# Patient Record
Sex: MALE | Race: WHITE | HISPANIC OR LATINO
[De-identification: names, ages, dates, MRNs, and addresses within clinical notes are randomized per-mention and may not be internally consistent; named-entity substitution may affect disease eponyms.]

---

## 2021-05-25 ENCOUNTER — APPOINTMENT (OUTPATIENT)
Dept: FAMILY MEDICINE | Facility: CLINIC | Age: 61
End: 2021-05-25
Payer: COMMERCIAL

## 2021-05-25 VITALS — SYSTOLIC BLOOD PRESSURE: 117 MMHG | DIASTOLIC BLOOD PRESSURE: 80 MMHG

## 2021-05-25 VITALS
WEIGHT: 146 LBS | SYSTOLIC BLOOD PRESSURE: 122 MMHG | OXYGEN SATURATION: 98 % | TEMPERATURE: 97.3 F | HEIGHT: 65 IN | HEART RATE: 87 BPM | DIASTOLIC BLOOD PRESSURE: 80 MMHG | RESPIRATION RATE: 14 BRPM | BODY MASS INDEX: 24.32 KG/M2

## 2021-05-25 DIAGNOSIS — Z83.3 FAMILY HISTORY OF DIABETES MELLITUS: ICD-10-CM

## 2021-05-25 DIAGNOSIS — Z83.438 FAMILY HISTORY OF OTHER DISORDER OF LIPOPROTEIN METABOLISM AND OTHER LIPIDEMIA: ICD-10-CM

## 2021-05-25 DIAGNOSIS — R21 RASH AND OTHER NONSPECIFIC SKIN ERUPTION: ICD-10-CM

## 2021-05-25 PROCEDURE — 99072 ADDL SUPL MATRL&STAF TM PHE: CPT

## 2021-05-25 PROCEDURE — 36415 COLL VENOUS BLD VENIPUNCTURE: CPT

## 2021-05-25 PROCEDURE — 99205 OFFICE O/P NEW HI 60 MIN: CPT | Mod: 25

## 2021-05-25 NOTE — ASSESSMENT
[FreeTextEntry1] : Physical Exam:\par Constitutional: No acute distress, well appearing\par HEENT: erythematous , dry patches in forehead going into his scalp. Also indurated dark purple lesion with reddish borders on the tip of his Right ear. + TTP\par Neck: supple\par Cardiac: S1S2, Regular rate and rhythm, No murmurs\par Pulmonary: No respiratory distress, Lungs clear to auscultation bilaterally, no wheezing, rales, or rhonchi\par Abdomen: Soft, non-tender, non-distended, no guarding, normal bowel sounds\par Vascular: No peripheral edema\par Neurology: Coordination grossly intact, no focal deficits\par Psychiatric: Alert and oriented x3, normal mood\par Skin: also has flesh colored warts in his left side of body. \par \par \par A/P:\par HCM:\par - will upload recent blood test results from his old PCP Dr. Glenis Darnell that was collected on 4/15/21. lipids, CMP, CBC, TSH, UA, vitamin b12 and vitamin D were wnl. \par - f/u HIV testing\par - refused flu shot this year\par - refused TDAP today- but he said he would be interested in getting it when he returns for follow up. \par - Colon CA screening- he last got a colonoscopy in 2017 with Dr. Sharma which was reportedly normal and he was told to get it done again in 5 years- due in 2022. \par \par Prediabetes: \par a1c from old pcp labs from 4/15/21 were 5.8.\par -educated on importance of eating healthy diet and exercise\par - will check again in next annual. \par \par HLD\par labs from 4/15/21 show stable lipid panel while taking medication\par controlled\par - advised to c/w rosuvastatin\par \par Pulmonary Nodule:\par patient was agreeable to get imaging \par - CT chest\par \par ear lesion: \par unclear cause for lesion. \par - will get PTT, PT, INR \par - advised him to avoid aspirin use\par - also advised to take tylenol for the pain and if not improved can take nsaids. should not be taking nsaids around the clock every day\par - given referral for derm and ENT\par \par forehead rash:\par - derm referral\par \par lipoma of abdomen\par - give surgical referral\par \par \par I reviewed patient's outside records that he brought with him including lab testing from previous PCP, and workup from different ENT and dermatologists' regarding his ear lesion, including hypercoaguable workup and ear biopsy. \par

## 2021-05-25 NOTE — HISTORY OF PRESENT ILLNESS
[FreeTextEntry1] : Pt is here for establish care. [de-identified] : 60y M w/ PMhx BPH, HLD, preDM, pulmonary nodule 04/2015, presenting to establish care. Also c/o ear lesion and lipoma. \par \par Ear lesion: c/o dark purple lesion on the tip of his R ear. onset 10 months. He reports having it in his L ear about 9 months ago which resolved on its own but still has some scarring. the left ear improved without abx or creams. he has it now on his right ear but it was worse  about 5 days ago- he showed me pictures: it appears dark/bruise-like with erythematous borders on the edges.  he thought it was the mask straps that were causing it bc was worse on work days however he now attaches the mask straps to his hat but despite this it has not resolved. it never went away since he first got it but it flares intermittently. 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 he doesn't like working in the cold bc he feels itchy in his hands and feet so he tries to avoid it. he does recall  a possible event where a box may have hit his ear but he wasn’t in too much pain from it so didn't think it was from that. He was seeing Dr. Mensah as his PCP before who referred him to ENT and dermatology. he had a shave biopsy done by derm Dr. Arturo Ferguson on 11/9/2020. he brought the biopsy report wit him today: "intravascular fibrin thrombi w/ noninflmamatory purpura. precise etiology is difficult to determine with certainty. clinical correlation with coagulation studies may be appropriate. " It is very painful when it flares up and it makes his head hurt as well- generalized headache, not localized to any particular area. he used to take baby aspirin for the pain -2 tabs a day. but he stopped that and now takes ibuprofen (does not know the dose) every 8 hours every day for the "inflammation". he last took one last night. \par \par Lipoma: He reports having a lipoma in his left inner forearm that was removed previously. He reports having one in his left lower abdomen that he never got removed bc the pandemic started. He would like a surgery referral for evaluation for lipoma excision. denies pain from the site. \par \par Pulm nodule: He was referred to pulmonary by Dr. Jeannette Ross in 2015 for an incidental pulmonary nodule that was found in a CT abdomen for abdominal pain. It was a 6mm RML nodule. He was evaluated by pulmonary Dr. Bowman on 12/2015 who had ordered a follow up CT for 04/2016 (in a year) to evaluate for progression/changes of the nodule. He is a lifelong nonsmoker, from [facility], he came in [place] and did not return to his home country since his arrival. Patient however stopped following at the practice and the repeat CT was never done. He reports today not knowing that the office was trying to reach him. He had also forgotten to get the follow up chest CT done. denies weight changes, night sweats, cough, hx of tuberculosis or family hx malignancy. he does feel tired although he gets 7h sleep at night every night. \par \par Prediabetes: He brought recent labs with him from his old PCP Dr. Lopez from 4/15/21: a1c of 5.8. He admits to not watching his diet. He eats rice every day. \par \par HLD: Recent labs from his old PCP show improved lipid panel while taking rosuvastatin 10mg \par \par BPH: has been taking tamsulosin 0.4mg 2 capsules with dinner.

## 2021-05-25 NOTE — HEALTH RISK ASSESSMENT
[No] : In the past 12 months have you used drugs other than those required for medical reasons? No [0] : 2) Feeling down, depressed, or hopeless: Not at all (0) [Patient reported colonoscopy was normal] : Patient reported colonoscopy was normal [HIV Test offered] : HIV Test offered [With Family] : lives with family [Employed] : employed [] : No [[id number]] : 0 [ColonoscopyDate] : 01/2017 [ColonoscopyComments] : Dr. Sharma: was told to get it done in 5 years.  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

## 2021-05-25 NOTE — DATA REVIEWED
[FreeTextEntry1] : I reviewed patient's outside records that he brought with im including lab testing from previous PCP, and workup from different ENT and dermatologists' regarding his ear lesion, including hypercoaguable workup and ear biopsy.

## 2021-05-26 LAB
HIV1+2 AB SPEC QL IA.RAPID: NONREACTIVE
INR PPP: 1 RATIO
PT BLD: 11.8 SEC

## 2021-06-04 ENCOUNTER — RESULT REVIEW (OUTPATIENT)
Age: 61
End: 2021-06-04

## 2021-06-09 ENCOUNTER — APPOINTMENT (OUTPATIENT)
Dept: FAMILY MEDICINE | Facility: CLINIC | Age: 61
End: 2021-06-09
Payer: COMMERCIAL

## 2021-06-09 VITALS — OXYGEN SATURATION: 98 % | TEMPERATURE: 97.8 F | HEART RATE: 104 BPM

## 2021-06-09 VITALS — DIASTOLIC BLOOD PRESSURE: 80 MMHG | SYSTOLIC BLOOD PRESSURE: 120 MMHG

## 2021-06-09 DIAGNOSIS — D17.9 BENIGN LIPOMATOUS NEOPLASM, UNSPECIFIED: ICD-10-CM

## 2021-06-09 DIAGNOSIS — Z23 ENCOUNTER FOR IMMUNIZATION: ICD-10-CM

## 2021-06-09 PROCEDURE — 90715 TDAP VACCINE 7 YRS/> IM: CPT

## 2021-06-09 PROCEDURE — 99072 ADDL SUPL MATRL&STAF TM PHE: CPT

## 2021-06-09 PROCEDURE — 90471 IMMUNIZATION ADMIN: CPT

## 2021-06-09 PROCEDURE — 99213 OFFICE O/P EST LOW 20 MIN: CPT | Mod: 25

## 2021-06-09 NOTE — HISTORY OF PRESENT ILLNESS
[de-identified] : 60y M w/ PMHx BPH, HLD, PreDM, pulmonary nodule 4/2015, presenting for f/u. He reports snoring a lot at night. His wife attests to him snoring very loudly and it sometimes wakes him up abruptly in the middle of the night. \par \par Ear lesion: dark purple lesion on tip of R ear for 10 months (L ear lesion resolved 9 months ago on its own). dark bruise-like w/ erythematous borders. Shave bx done by derm Dr. Arturo Ferguson in 11/9/20: Intravascular fibrin thrombi w/ noninflammatory purpura. Very painful when it flares- gives him a generalized headache. I asked him on last visit to stop using aspirin BID and ibuprofen q8h which he hasn’t been taking for the past week. the ear lesion has resolved at this time. Normal coags on testing done in office. He has an appt with ENT today at 2pm. \par \par Pulmonary nodule: incidental 6mm RML nodule seen in a CT in 2015 ordered by Dr. Jeannette pendleton for abdominal pain. Was evaluated by pulm Dr. Bowman prior and advised to repeat CT in one year which he did not. Nonsmoker. He had it repeated with me on 6/2: no signficant change. \par \par Lipoma of abdomen: removed by surgery on 6/4/21 by Dr. Corcoran. pathology confirmed lipoma.\par \par

## 2021-06-09 NOTE — ASSESSMENT
[FreeTextEntry1] : Physical Exam:\par Constitutional: No acute distress, well appearing\par HEENT: Normocephalic, atraumatic\par Neck: supple\par Cardiac: S1S2, Regular rate and rhythm, No murmurs\par Pulmonary: No respiratory distress, Lungs clear to auscultation bilaterally, no wheezing, rales, or rhonchi\par Abdomen: Soft, non-tender, non-distended, no guarding, normal bowel sounds\par Vascular: No peripheral edema\par Neurology: Coordination grossly intact, no focal deficits\par Psychiatric: Alert and oriented x3, normal mood\par \par \par A/P:\par \par Ear lesion: \par Improved\par - will f/u appt with ENT today- they will provide me with the name of the ENT doc so our office can get his records.\par \par Pulmonary nodule:\par CT done on 6/2: no signficant change\par Stable\par \par sleep apnea:\par patient and his wife describe symptoms typical of sleep apnea\par explained to him that he will need a sleep study to confirm dx and he may need tx\par agreeable to see pulmonary\par -pumonary referral\par \par \par HCM: \par - will get TDAP today. 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

## 2021-06-09 NOTE — HISTORY OF PRESENT ILLNESS
[de-identified] : 60y M w/ PMHx BPH, HLD, PreDM, pulmonary nodule 4/2015, presenting for f/u. He reports snoring a lot at night. His wife attests to him snoring very loudly and it sometimes wakes him up abruptly in the middle of the night. \par \par Ear lesion: dark purple lesion on tip of R ear for 10 months (L ear lesion resolved 9 months ago on its own). dark bruise-like w/ erythematous borders. Shave bx done by derm Dr. Arturo Ferguson in 11/9/20: Intravascular fibrin thrombi w/ noninflammatory purpura. Very painful when it flares- gives him a generalized headache. I asked him on last visit to stop using aspirin BID and ibuprofen q8h which he hasn’t been taking for the past week. the ear lesion has resolved at this time. Normal coags on testing done in office. He has an appt with ENT today at 2pm. \par \par Pulmonary nodule: incidental 6mm RML nodule seen in a CT in 2015 ordered by Dr. Jeannette pendleton for abdominal pain. Was evaluated by pulm Dr. Bowman prior and advised to repeat CT in one year which he did not. Nonsmoker. He had it repeated with me on 6/2: no signficant change. \par \par Lipoma of abdomen: removed by surgery on 6/4/21 by Dr. Corcoran. pathology confirmed lipoma.\par \par

## 2021-12-23 ENCOUNTER — APPOINTMENT (OUTPATIENT)
Dept: FAMILY MEDICINE | Facility: CLINIC | Age: 61
End: 2021-12-23
Payer: COMMERCIAL

## 2021-12-23 VITALS
RESPIRATION RATE: 14 BRPM | WEIGHT: 142.8 LBS | BODY MASS INDEX: 23.76 KG/M2 | OXYGEN SATURATION: 98 % | HEART RATE: 87 BPM | TEMPERATURE: 96.8 F

## 2021-12-23 VITALS — DIASTOLIC BLOOD PRESSURE: 78 MMHG | SYSTOLIC BLOOD PRESSURE: 109 MMHG

## 2021-12-23 DIAGNOSIS — H93.90 UNSPECIFIED DISORDER OF EAR, UNSPECIFIED EAR: ICD-10-CM

## 2021-12-23 DIAGNOSIS — K92.1 MELENA: ICD-10-CM

## 2021-12-23 DIAGNOSIS — R10.9 UNSPECIFIED ABDOMINAL PAIN: ICD-10-CM

## 2021-12-23 DIAGNOSIS — N40.0 BENIGN PROSTATIC HYPERPLASIA WITHOUT LOWER URINARY TRACT SYMPMS: ICD-10-CM

## 2021-12-23 DIAGNOSIS — R35.0 FREQUENCY OF MICTURITION: ICD-10-CM

## 2021-12-23 DIAGNOSIS — Z00.00 ENCOUNTER FOR GENERAL ADULT MEDICAL EXAMINATION W/OUT ABNORMAL FINDINGS: ICD-10-CM

## 2021-12-23 PROCEDURE — 81003 URINALYSIS AUTO W/O SCOPE: CPT | Mod: QW

## 2021-12-23 PROCEDURE — 99214 OFFICE O/P EST MOD 30 MIN: CPT | Mod: 25

## 2021-12-23 PROCEDURE — 36415 COLL VENOUS BLD VENIPUNCTURE: CPT

## 2021-12-23 NOTE — ASSESSMENT
[FreeTextEntry1] : Physical Exam:\par Constitutional: No acute distress, well appearing\par HEENT: Normocephalic, atraumatic\par Neck: supple\par Cardiac: S1S2, Regular rate and rhythm, No murmurs\par Pulmonary: No respiratory distress, Lungs clear to auscultation bilaterally, no wheezing, rales, or rhonchi\par Abdomen: Soft, non-tender, non-distended, no guarding, normal bowel sounds\par Vascular: No peripheral edema\par Neurology: Coordination grossly intact, no focal deficits\par Psychiatric: Alert and oriented x3, normal mood\par \par \par \par A/P\par stomach discomfort/ black stools\par VS wnl\par NAD\par - given GI referral and advised to make appt as soon as posible. he is likely due for colonoscopy as well. \par - in meantime have sent out CBC- bloodwork drawn in office \par \par Increased urinary frequency:\par urine dipstick is neg for UTI\par - UA and UCx as well as PSA sent out\par - if negative for UTI, he will need to f/u w/ his urologist for evaluation of worsening BPH\par \par ear lesion\par Resolved\par \par pulm nodule: \par stable\par \par snoring\par - given pulmonary referral again\par \par BPH:\par - c/w tamsulosin 0.4 mg as directed by urology\par \par HLD:\par - lipids obtained today\par - will send refills for  rosuvastatin 10 based on results\par - can c/w lovaza- says he has been taking old rx by his old pcp

## 2021-12-23 NOTE — HISTORY OF PRESENT ILLNESS
[FreeTextEntry1] : Pt is here for medication renewal. [de-identified] : 61y M w/ PMHx BPH, HLD, preDM, pulm nodule 4/2015, presenting for med renewals.  c/o stomach discomfort and dysuria. \par \par stomach discomfort x 6 months. usually after eating. he has been having soft stools. has not had any diet change. no recent travel.  also c/o small amounts of black stools. he traveled to [place] 6 months ago. denies nausea/vomiting. denies abdominal pain. denies weight loss. denies hx cancer in family. he had a colonoscopy about 5 years ago with Dr. Sharma in [place] which was reportedly normal. \par \par c/o dysuria and increased urinary frequency, especially at night. at night its difficult to urinate, he has some urinary hesitancy. does not feel like fully emptying bladder. + dribbling of urine. he saw his urologist on tuesday who checked his PSA but he does not know the results. he was told that he didn't have infection. he used to see blood in the urine 2 years ago bc he had prostate problems. he was told this time that there was no blood in the urine. \par \par Dark purple ear lesion on last visit  x 10 months. shave bx by derm Dr. Arturo Ferguson 2020: intravascular fibrin thrombi w/ noninflammatory purpura. Painful when it flares- causes headaches. Saw ENT who advised to see allergy and derm and avoid anything touching his ears and to f/u if it recurs. it has resolved at this time. \par \par pulm nodule: stable 6mm RML nodule- CT done 6/2021.\par \par snoring : referred to pulmonary for possibly sleep apnea. he has not gone yet. today he is still c/o moments where he wakes up from his sleep suddenly. + snoring. he is constantly sleepy and yawning. \par \par BPH: tamsulosin 0.4 mg \par \par HLD: rosuvastatin 10

## 2021-12-24 LAB
BILIRUB UR QL STRIP: NORMAL
CLARITY UR: CLEAR
COLLECTION METHOD: NORMAL
GLUCOSE UR-MCNC: NORMAL
HCG UR QL: 0.2 EU/DL
HGB UR QL STRIP.AUTO: NORMAL
KETONES UR-MCNC: NORMAL
LEUKOCYTE ESTERASE UR QL STRIP: NORMAL
NITRITE UR QL STRIP: NORMAL
PH UR STRIP: 7
PROT UR STRIP-MCNC: NORMAL
SP GR UR STRIP: 1.02

## 2021-12-27 LAB
ALBUMIN SERPL ELPH-MCNC: 4.9 G/DL
ALP BLD-CCNC: 89 U/L
ALT SERPL-CCNC: 30 U/L
ANION GAP SERPL CALC-SCNC: 12 MMOL/L
AST SERPL-CCNC: 29 U/L
BASOPHILS # BLD AUTO: 0.04 K/UL
BASOPHILS NFR BLD AUTO: 0.7 %
BILIRUB SERPL-MCNC: 0.2 MG/DL
BUN SERPL-MCNC: 19 MG/DL
CALCIUM SERPL-MCNC: 10.1 MG/DL
CHLORIDE SERPL-SCNC: 103 MMOL/L
CHOLEST SERPL-MCNC: 153 MG/DL
CO2 SERPL-SCNC: 27 MMOL/L
CREAT SERPL-MCNC: 1.1 MG/DL
EOSINOPHIL # BLD AUTO: 0.11 K/UL
EOSINOPHIL NFR BLD AUTO: 1.9 %
ESTIMATED AVERAGE GLUCOSE: 123 MG/DL
GLUCOSE SERPL-MCNC: 113 MG/DL
HBA1C MFR BLD HPLC: 5.9 %
HCT VFR BLD CALC: 45.1 %
HDLC SERPL-MCNC: 53 MG/DL
HGB BLD-MCNC: 15.1 G/DL
IMM GRANULOCYTES NFR BLD AUTO: 0.2 %
LDLC SERPL CALC-MCNC: 76 MG/DL
LYMPHOCYTES # BLD AUTO: 2.01 K/UL
LYMPHOCYTES NFR BLD AUTO: 34.2 %
MAN DIFF?: NORMAL
MCHC RBC-ENTMCNC: 29.5 PG
MCHC RBC-ENTMCNC: 33.5 GM/DL
MCV RBC AUTO: 88.3 FL
MONOCYTES # BLD AUTO: 0.41 K/UL
MONOCYTES NFR BLD AUTO: 7 %
NEUTROPHILS # BLD AUTO: 3.3 K/UL
NEUTROPHILS NFR BLD AUTO: 56 %
NONHDLC SERPL-MCNC: 100 MG/DL
PLATELET # BLD AUTO: 226 K/UL
POTASSIUM SERPL-SCNC: 4.5 MMOL/L
PROT SERPL-MCNC: 6.6 G/DL
RBC # BLD: 5.11 M/UL
RBC # FLD: 11.9 %
SODIUM SERPL-SCNC: 142 MMOL/L
T4 FREE SERPL-MCNC: 1.5 NG/DL
TRIGL SERPL-MCNC: 119 MG/DL
TSH SERPL-ACNC: 1.72 UIU/ML
WBC # FLD AUTO: 5.88 K/UL

## 2021-12-28 ENCOUNTER — NON-APPOINTMENT (OUTPATIENT)
Age: 61
End: 2021-12-28

## 2021-12-29 LAB
APPEARANCE: ABNORMAL
APPEARANCE: ABNORMAL
BACTERIA: NEGATIVE
BILIRUBIN URINE: NEGATIVE
BILIRUBIN URINE: NEGATIVE
BLOOD URINE: NEGATIVE
BLOOD URINE: NEGATIVE
COLOR: YELLOW
COLOR: YELLOW
GLUCOSE QUALITATIVE U: NEGATIVE
GLUCOSE QUALITATIVE U: NEGATIVE
HYALINE CASTS: 0 /LPF
KETONES URINE: NEGATIVE
KETONES URINE: NEGATIVE
LEUKOCYTE ESTERASE URINE: NEGATIVE
LEUKOCYTE ESTERASE URINE: NEGATIVE
MICROSCOPIC-UA: NORMAL
NITRITE URINE: NEGATIVE
NITRITE URINE: NEGATIVE
PH URINE: 7
PH URINE: 7
PROTEIN URINE: NEGATIVE
PROTEIN URINE: NEGATIVE
RED BLOOD CELLS URINE: 9 /HPF
SPECIFIC GRAVITY URINE: 1.02
SPECIFIC GRAVITY URINE: 1.02
SQUAMOUS EPITHELIAL CELLS: 0 /HPF
UROBILINOGEN URINE: NORMAL
UROBILINOGEN URINE: NORMAL
WHITE BLOOD CELLS URINE: 0 /HPF

## 2022-01-24 ENCOUNTER — APPOINTMENT (OUTPATIENT)
Dept: FAMILY MEDICINE | Facility: CLINIC | Age: 62
End: 2022-01-24
Payer: COMMERCIAL

## 2022-01-24 VITALS
HEIGHT: 65 IN | WEIGHT: 145 LBS | OXYGEN SATURATION: 98 % | RESPIRATION RATE: 16 BRPM | HEART RATE: 80 BPM | BODY MASS INDEX: 24.16 KG/M2 | TEMPERATURE: 97.8 F

## 2022-01-24 VITALS — HEART RATE: 76 BPM | DIASTOLIC BLOOD PRESSURE: 70 MMHG | SYSTOLIC BLOOD PRESSURE: 110 MMHG

## 2022-01-24 DIAGNOSIS — Z00.8 ENCOUNTER FOR OTHER GENERAL EXAMINATION: ICD-10-CM

## 2022-01-24 PROCEDURE — 99214 OFFICE O/P EST MOD 30 MIN: CPT

## 2022-01-26 NOTE — HISTORY OF PRESENT ILLNESS
[No Pertinent Cardiac History] : no history of aortic stenosis, atrial fibrillation, coronary artery disease, recent myocardial infarction, or implantable device/pacemaker [Chronic Anticoagulation] : no chronic anticoagulation [Chronic Kidney Disease] : no chronic kidney disease [Diabetes] : no diabetes [FreeTextEntry1] : 1/25/2022 [FreeTextEntry2] : 1/25/2022 [FreeTextEntry3] : Dr. Perkins [FreeTextEntry4] : medical clearanceTURP

## 2022-04-12 ENCOUNTER — RX RENEWAL (OUTPATIENT)
Age: 62
End: 2022-04-12

## 2022-05-10 ENCOUNTER — APPOINTMENT (OUTPATIENT)
Dept: INTERNAL MEDICINE | Facility: CLINIC | Age: 62
End: 2022-05-10
Payer: COMMERCIAL

## 2022-05-10 VITALS
HEIGHT: 65 IN | HEART RATE: 86 BPM | RESPIRATION RATE: 14 BRPM | OXYGEN SATURATION: 97 % | SYSTOLIC BLOOD PRESSURE: 129 MMHG | BODY MASS INDEX: 23.82 KG/M2 | DIASTOLIC BLOOD PRESSURE: 82 MMHG | TEMPERATURE: 97.6 F | WEIGHT: 143 LBS

## 2022-05-10 DIAGNOSIS — F41.9 ANXIETY DISORDER, UNSPECIFIED: ICD-10-CM

## 2022-05-10 PROCEDURE — 99213 OFFICE O/P EST LOW 20 MIN: CPT

## 2022-05-10 RX ORDER — TAMSULOSIN HYDROCHLORIDE 0.4 MG/1
0.4 CAPSULE ORAL DAILY
Qty: 60 | Refills: 0 | Status: DISCONTINUED | COMMUNITY
Start: 2021-05-25 | End: 2022-05-10

## 2022-05-10 NOTE — PHYSICAL EXAM
[No Acute Distress] : no acute distress [Well Nourished] : well nourished [Well Developed] : well developed [Well-Appearing] : well-appearing [Normal Voice/Communication] : normal voice/communication [Normal Sclera/Conjunctiva] : normal sclera/conjunctiva [No Respiratory Distress] : no respiratory distress  [Clear to Auscultation] : lungs were clear to auscultation bilaterally [Normal Rate] : normal rate  [Normal S1, S2] : normal S1 and S2 [Soft] : abdomen soft [Non Tender] : non-tender [Normal Bowel Sounds] : normal bowel sounds [Speech Grossly Normal] : speech grossly normal [Normal Affect] : the affect was normal [Normal Mood] : the mood was normal

## 2022-05-10 NOTE — PLAN
[FreeTextEntry1] : 61-year-old male for follow-up.\par \par Hyperlipidemia.  Labs done within past 6 months reviewed.  Refills sent to pharmacy.\par \par Anxiety.  Recent stressor noted.  Counseling and/or medication discussed.  Patient would like to hold off for now.\par \par Signs and symptoms warranting further evaluation discussed.  All questions answered.  Patient voiced understanding and agreement above plan.  Return to clinic as recommended for follow-up with PCP.

## 2022-05-10 NOTE — HISTORY OF PRESENT ILLNESS
[FreeTextEntry1] : Patient is here for F/U  [de-identified] : 61-year-old male for follow-up.  Patient requesting refills of cholesterol medication.\par \par Additionally, patient does report anxiety surrounding death of family member recently.  Is doing okay given circumstances.

## 2022-06-07 ENCOUNTER — APPOINTMENT (OUTPATIENT)
Dept: FAMILY MEDICINE | Facility: CLINIC | Age: 62
End: 2022-06-07

## 2022-08-22 ENCOUNTER — RX RENEWAL (OUTPATIENT)
Age: 62
End: 2022-08-22

## 2022-09-06 ENCOUNTER — APPOINTMENT (OUTPATIENT)
Dept: PULMONOLOGY | Facility: CLINIC | Age: 62
End: 2022-09-06

## 2022-09-06 VITALS
OXYGEN SATURATION: 98 % | HEART RATE: 64 BPM | WEIGHT: 146 LBS | BODY MASS INDEX: 24.32 KG/M2 | RESPIRATION RATE: 16 BRPM | DIASTOLIC BLOOD PRESSURE: 76 MMHG | HEIGHT: 65 IN | SYSTOLIC BLOOD PRESSURE: 124 MMHG

## 2022-09-06 DIAGNOSIS — Z86.39 PERSONAL HISTORY OF OTHER ENDOCRINE, NUTRITIONAL AND METABOLIC DISEASE: ICD-10-CM

## 2022-09-06 DIAGNOSIS — Z78.9 OTHER SPECIFIED HEALTH STATUS: ICD-10-CM

## 2022-09-06 PROCEDURE — 99204 OFFICE O/P NEW MOD 45 MIN: CPT

## 2022-09-06 RX ORDER — FINASTERIDE 5 MG/1
5 TABLET, FILM COATED ORAL
Qty: 90 | Refills: 0 | Status: ACTIVE | COMMUNITY
Start: 2022-06-30

## 2022-09-06 NOTE — CONSULT LETTER
[Dear  ___] : Dear  [unfilled], [Consult Letter:] : I had the pleasure of evaluating your patient, [unfilled]. [Please see my note below.] : Please see my note below. [Consult Closing:] : Thank you very much for allowing me to participate in the care of this patient.  If you have any questions, please do not hesitate to contact me. [Sincerely,] : Sincerely, [FreeTextEntry3] : Dewey Karimi MD, FCCP, D. ABSM\par Pulmonary and Sleep Medicine\par [facility] at [place]

## 2022-09-06 NOTE — REASON FOR VISIT
[Initial] : an initial visit [Abnormal CXR/ Chest CT] : an abnormal CXR/ chest CT [Sleep Apnea] : sleep apnea [Shortness of Breath] : shortness of breath [Pulmonary Nodules] : pulmonary nodules [Ad Hoc ] : provided by an ad hoc  [TextBox_44] : Prior Covid infection [Interpreters_FullName] : Urvashi [Interpreters_Relationshiptopatient] : LPN [TWNoteComboBox1] : [place]

## 2022-09-06 NOTE — HISTORY OF PRESENT ILLNESS
[Never] : never [Initial Evaluation] : an initial evaluation of [Excessive Daytime Sleepiness] : excessive daytime sleepiness [Witnessed Apnea During Sleep] : witnessed apnea during sleep [Witnessed Gasping During Sleep] : witnessed gasping during sleep [Snoring] : snoring [Unrefreshing Sleep] : unrefreshing sleep [Sleepy When Sedentary] : sleepy when sedentary [TextBox_4] : Pt s/p Covid infection 12/2021 with body aches and loss of taste and smell which has improved but no respiratory symptoms at that time. \par Patient c/o SOBOE but is otherwise without associated respiratory complaints.  [On ___] : performed on [unfilled] [Patient] : the patient [To Assess ___] : to assess [unfilled] [None] : no new symptoms reported [FreeTextEntry9] : Chest CT [FreeTextEntry8] : Stable 2 mm RLL and RML pulmonary nodules without change from 2015.

## 2022-09-06 NOTE — DISCUSSION/SUMMARY
[FreeTextEntry1] : \par #1. Will schedule PFTs in near future to assess lung function given prior Covid infection \par #2. The patient does not appear to require chronic BD therapy at this time\par #3. Diet and exercise for weight loss\par #4. SOBOE is likely at least somewhat related to deconditioning \par #5. CXR to evaluate SOBOE given prior Covid infection \par #6. Home PSG to evaluate for possible BEBE\par #7. Pt had both Covid vaccines and booster\par #8. F/u in 2 months with PFTs, CXR, and HST\par #9. Prior CT from 6/2/21 revealed stable 2 mm RLL and RML pulmonary nodules c/w 12/22/15\par #10. Reviewed risks of exposure and symptoms of Covid-19 virus, including how the virus is spread and precautions to avoid nixon virus. \par \par The patient expressed understanding and agreement with the above recommendations/plan and accepts responsibility to be compliant with recommended testing, therapies, and f/u visits.\par All relevant questions and concerns were addressed.

## 2022-09-06 NOTE — PHYSICAL EXAM
[No Acute Distress] : no acute distress [Normal Appearance] : normal appearance [Supple] : supple [Normal Rate/Rhythm] : normal rate/rhythm [Normal S1, S2] : normal s1, s2 [No Edema] : no edema [No Murmurs] : no murmurs [No Resp Distress] : no resp distress [No Acc Muscle Use] : no acc muscle use [Normal Rhythm and Effort] : normal rhythm and effort [Clear to Auscultation Bilaterally] : clear to auscultation bilaterally [No Abnormalities] : no abnormalities

## 2022-09-06 NOTE — REVIEW OF SYSTEMS
[Postnasal Drip] : postnasal drip [SOB on Exertion] : sob on exertion [Seasonal Allergies] : seasonal allergies [Back Pain] : back pain [Negative] : Endocrine

## 2022-09-20 ENCOUNTER — OUTPATIENT (OUTPATIENT)
Dept: OUTPATIENT SERVICES | Facility: HOSPITAL | Age: 62
LOS: 1 days | End: 2022-09-20
Payer: COMMERCIAL

## 2022-09-20 DIAGNOSIS — G47.33 OBSTRUCTIVE SLEEP APNEA (ADULT) (PEDIATRIC): ICD-10-CM

## 2022-09-20 PROCEDURE — 95800 SLP STDY UNATTENDED: CPT

## 2022-11-14 ENCOUNTER — APPOINTMENT (OUTPATIENT)
Dept: FAMILY MEDICINE | Facility: CLINIC | Age: 62
End: 2022-11-14

## 2022-11-14 VITALS
SYSTOLIC BLOOD PRESSURE: 126 MMHG | HEIGHT: 65 IN | HEART RATE: 69 BPM | BODY MASS INDEX: 24.16 KG/M2 | WEIGHT: 145 LBS | DIASTOLIC BLOOD PRESSURE: 75 MMHG

## 2022-11-14 DIAGNOSIS — Z12.11 ENCOUNTER FOR SCREENING FOR MALIGNANT NEOPLASM OF COLON: ICD-10-CM

## 2022-11-14 DIAGNOSIS — Z11.59 ENCOUNTER FOR SCREENING FOR OTHER VIRAL DISEASES: ICD-10-CM

## 2022-11-14 PROCEDURE — 99215 OFFICE O/P EST HI 40 MIN: CPT | Mod: 25

## 2022-11-14 PROCEDURE — 36415 COLL VENOUS BLD VENIPUNCTURE: CPT

## 2022-11-14 RX ORDER — AZITHROMYCIN 250 MG/1
250 TABLET, FILM COATED ORAL
Qty: 6 | Refills: 0 | Status: DISCONTINUED | COMMUNITY
Start: 2022-11-03

## 2022-11-14 RX ORDER — BROMPHENIRAMINE MALEATE, PSEUDOEPHEDRINE HYDROCHLORIDE, 2; 30; 10 MG/5ML; MG/5ML; MG/5ML
30-2-10 SYRUP ORAL
Qty: 200 | Refills: 0 | Status: DISCONTINUED | COMMUNITY
Start: 2022-11-03

## 2022-11-14 NOTE — PLAN
[FreeTextEntry1] : \par In regards to hyperlipidemia, pre-DM:\par Lifestyle modifications discussed\par Checking lipid panel and HbA1C \par Continue Rosuvastatin 5 mg \par Continue Omega 3\par \par BPH\par Managed by urology, on FInasteride\par \par This was an extensive visit that included review of previous labs and specialists notes before and after the face to face encounter \par \par Ordering colon cancer screen, hep C\par Flu vaccine encouraged\par Return to care: within 3 months for follow up or earlier if needed\par Call or return for any questions

## 2022-11-14 NOTE — HEALTH RISK ASSESSMENT
[0] : 2) Feeling down, depressed, or hopeless: Not at all (0) [PHQ-2 Negative - No further assessment needed] : PHQ-2 Negative - No further assessment needed [Never] : Never [No] : In the past 12 months have you used drugs other than those required for medical reasons? No [[id number]] : 0

## 2022-11-14 NOTE — HISTORY OF PRESENT ILLNESS
[FreeTextEntry1] : New patient, follow up in  medical conditions [de-identified] : Mr. CAT CHEN is a pleasant 61 year old male with PMH of  HLD, BPH, pre-DM, BEBE no yet on CPAP who comes in to the office for follow up in medical conditions. No other complanits.

## 2022-11-15 ENCOUNTER — APPOINTMENT (OUTPATIENT)
Dept: FAMILY MEDICINE | Facility: CLINIC | Age: 62
End: 2022-11-15

## 2022-11-15 LAB
ANION GAP SERPL CALC-SCNC: 14 MMOL/L
BUN SERPL-MCNC: 12 MG/DL
CALCIUM SERPL-MCNC: 9.5 MG/DL
CHLORIDE SERPL-SCNC: 105 MMOL/L
CHOLEST SERPL-MCNC: 174 MG/DL
CO2 SERPL-SCNC: 22 MMOL/L
CREAT SERPL-MCNC: 0.86 MG/DL
EGFR: 99 ML/MIN/1.73M2
ESTIMATED AVERAGE GLUCOSE: 128 MG/DL
GLUCOSE SERPL-MCNC: 88 MG/DL
HBA1C MFR BLD HPLC: 6.1 %
HCV AB SER QL: NONREACTIVE
HCV S/CO RATIO: 0.11 S/CO
HDLC SERPL-MCNC: 47 MG/DL
LDLC SERPL CALC-MCNC: 109 MG/DL
NONHDLC SERPL-MCNC: 127 MG/DL
POTASSIUM SERPL-SCNC: 4.4 MMOL/L
SODIUM SERPL-SCNC: 141 MMOL/L
TRIGL SERPL-MCNC: 88 MG/DL

## 2022-11-15 RX ORDER — ROSUVASTATIN CALCIUM 20 MG/1
20 TABLET, FILM COATED ORAL
Qty: 90 | Refills: 1 | Status: ACTIVE | COMMUNITY
Start: 2021-05-25 | End: 1900-01-01

## 2022-11-16 ENCOUNTER — APPOINTMENT (OUTPATIENT)
Dept: PULMONOLOGY | Facility: CLINIC | Age: 62
End: 2022-11-16

## 2022-11-16 ENCOUNTER — NON-APPOINTMENT (OUTPATIENT)
Age: 62
End: 2022-11-16

## 2022-11-16 VITALS
RESPIRATION RATE: 16 BRPM | SYSTOLIC BLOOD PRESSURE: 134 MMHG | OXYGEN SATURATION: 99 % | DIASTOLIC BLOOD PRESSURE: 78 MMHG | HEART RATE: 67 BPM

## 2022-11-16 VITALS — WEIGHT: 146 LBS | BODY MASS INDEX: 24.32 KG/M2 | HEIGHT: 65 IN

## 2022-11-16 DIAGNOSIS — R06.83 SNORING: ICD-10-CM

## 2022-11-16 DIAGNOSIS — Z86.16 PERSONAL HISTORY OF COVID-19: ICD-10-CM

## 2022-11-16 DIAGNOSIS — J44.9 CHRONIC OBSTRUCTIVE PULMONARY DISEASE, UNSPECIFIED: ICD-10-CM

## 2022-11-16 DIAGNOSIS — R06.02 SHORTNESS OF BREATH: ICD-10-CM

## 2022-11-16 PROCEDURE — 99214 OFFICE O/P EST MOD 30 MIN: CPT | Mod: 25

## 2022-11-16 RX ORDER — ALBUTEROL SULFATE 90 UG/1
108 (90 BASE) INHALANT RESPIRATORY (INHALATION)
Qty: 1 | Refills: 1 | Status: ACTIVE | COMMUNITY
Start: 2022-11-16 | End: 1900-01-01

## 2022-11-16 NOTE — DISCUSSION/SUMMARY
[FreeTextEntry1] : \par #1. PFTs performed today are essentially normal with an obstructive pattern c/w Gold Stage I COPD of unclear etiology in this never smoker though underlying asthma may be possible\par #2. The patient does not appear to require chronic BD therapy at this time but will Rx Albuterol inhaler therapy if needed\par #3. SOBOE is likely at least somewhat related to weight or deconditioning given PFT findings\par #4. Diet and exercise for weight loss \par #5. CXR to evaluate SOBOE given prior Covid infection was clear on 10/18/22\par #6. Home PSG to evaluate for possible BEBE was normal with an AHI of 2.2; will arrange for in-lab PSG given persistent symptoms\par #7. Pt had both Covid vaccines and booster\par #8. F/u after PSG and again in 6 months with spirometry to re-evaluate lung function\par #9. Prior CT from 6/2/21 revealed stable 2 mm RLL and RML pulmonary nodules c/w 12/22/15\par #10. Reviewed risks of exposure and symptoms of Covid-19 virus, including how the virus is spread and precautions to avoid nixon virus. \par \par The patient expressed understanding and agreement with the above recommendations/plan and accepts responsibility to be compliant with recommended testing, therapies, and f/u visits.\par All relevant questions and concerns were addressed.

## 2022-11-16 NOTE — PROCEDURE
[FreeTextEntry1] : PFTs 11/16/22 - Essentially normal but with a borderline obstructive pattern c/w Gold Stage I COPD of unclear etiology in this never smoker.

## 2022-11-16 NOTE — HISTORY OF PRESENT ILLNESS
[Never] : never [Follow-Up - Routine Clinic] : a routine clinic follow-up of [Excessive Daytime Sleepiness] : excessive daytime sleepiness [Witnessed Apnea During Sleep] : witnessed apnea during sleep [Witnessed Gasping During Sleep] : witnessed gasping during sleep [Snoring] : snoring [Unrefreshing Sleep] : unrefreshing sleep [Sleepy When Sedentary] : sleepy when sedentary [On ___] : performed on [unfilled] [Patient] : the patient [To Assess ___] : to assess [unfilled] [None] : no new symptoms reported [TextBox_4] : Pt s/p Covid infection 12/2021 with body aches and loss of taste and smell which has improved but no respiratory symptoms at that time. \par Patient c/o SOBOE but is otherwise without associated respiratory complaints.  [FreeTextEntry9] : Chest CT [FreeTextEntry8] : Stable 2 mm RLL and RML pulmonary nodules without change from 2015.

## 2022-11-16 NOTE — CONSULT LETTER
[Dear  ___] : Dear  [unfilled], [Consult Letter:] : I had the pleasure of evaluating your patient, [unfilled]. [Please see my note below.] : Please see my note below. [Consult Closing:] : Thank you very much for allowing me to participate in the care of this patient.  If you have any questions, please do not hesitate to contact me. [Sincerely,] : Sincerely, [FreeTextEntry3] : Dewey Karimi MD, FCCP, D. ABSM\par Pulmonary and Sleep Medicine\par [facility] at [place]

## 2022-11-16 NOTE — REASON FOR VISIT
[Follow-Up] : a follow-up visit [Abnormal CXR/ Chest CT] : an abnormal CXR/ chest CT [Sleep Apnea] : sleep apnea [Shortness of Breath] : shortness of breath [Pulmonary Nodules] : pulmonary nodules [[employer] ] : provided by [employer]   [Time Spent: ____ minutes] : Total time spent using  services: [unfilled] minutes. The patient's primary language is not English thus required  services. [TextBox_44] : Prior Covid infection [Interpreters_IDNumber] : [id number] [Interpreters_FullName] : Ramos [TWNoteComboBox1] : [place]i

## 2022-11-30 LAB — HEMOCCULT STL QL IA: NEGATIVE

## 2022-12-14 ENCOUNTER — APPOINTMENT (OUTPATIENT)
Dept: FAMILY MEDICINE | Facility: CLINIC | Age: 62
End: 2022-12-14

## 2022-12-21 ENCOUNTER — APPOINTMENT (OUTPATIENT)
Dept: FAMILY MEDICINE | Facility: CLINIC | Age: 62
End: 2022-12-21

## 2022-12-21 VITALS
SYSTOLIC BLOOD PRESSURE: 128 MMHG | RESPIRATION RATE: 16 BRPM | DIASTOLIC BLOOD PRESSURE: 77 MMHG | BODY MASS INDEX: 24.32 KG/M2 | WEIGHT: 146 LBS | HEART RATE: 84 BPM | HEIGHT: 65 IN

## 2022-12-21 PROCEDURE — G0008: CPT

## 2022-12-21 PROCEDURE — 90686 IIV4 VACC NO PRSV 0.5 ML IM: CPT

## 2022-12-21 PROCEDURE — 99214 OFFICE O/P EST MOD 30 MIN: CPT | Mod: 25

## 2022-12-21 PROCEDURE — 36415 COLL VENOUS BLD VENIPUNCTURE: CPT

## 2022-12-22 LAB
BASOPHILS # BLD AUTO: 0.03 K/UL
BASOPHILS NFR BLD AUTO: 0.5 %
EOSINOPHIL # BLD AUTO: 0.1 K/UL
EOSINOPHIL NFR BLD AUTO: 1.5 %
HCT VFR BLD CALC: 49.6 %
HGB BLD-MCNC: 15.9 G/DL
IMM GRANULOCYTES NFR BLD AUTO: 0.2 %
LYMPHOCYTES # BLD AUTO: 2.49 K/UL
LYMPHOCYTES NFR BLD AUTO: 37.9 %
MAN DIFF?: NORMAL
MCHC RBC-ENTMCNC: 29.3 PG
MCHC RBC-ENTMCNC: 32.1 GM/DL
MCV RBC AUTO: 91.3 FL
MONOCYTES # BLD AUTO: 0.42 K/UL
MONOCYTES NFR BLD AUTO: 6.4 %
NEUTROPHILS # BLD AUTO: 3.52 K/UL
NEUTROPHILS NFR BLD AUTO: 53.5 %
PLATELET # BLD AUTO: 253 K/UL
RBC # BLD: 5.43 M/UL
RBC # FLD: 12.4 %
WBC # FLD AUTO: 6.57 K/UL

## 2022-12-22 NOTE — HISTORY OF PRESENT ILLNESS
[FreeTextEntry1] : Follow up in  medical conditions [de-identified] : Mr. CAT CHEN is a pleasant 62 year old male with PMH of  HLD, BPH, pre-DM, BEBE no yet on CPAP who comes in to the office for follow up in medical conditions. His Rosuvastatin was recently increased to 20 mg QHS, tolerating it well. No other complaints.

## 2022-12-22 NOTE — PLAN
[FreeTextEntry1] : \par In regards to hyperlipidemia, pre-DM:\par Lifestyle modifications discussed\par Checking lipid panel and HbA1C \par Continue Rosuvastatin 5 mg \par Continue Omega 3\par \par BPH\par Managed by urology, on FInasteride\par \par This was an extensive visit that included review of previous labs and specialists notes before and after the face to face encounter \par \par Flu vaccine given, tolerated well\par Return to care: within 3 months for follow up or earlier if needed\par Call or return for any questions

## 2023-01-24 ENCOUNTER — RX RENEWAL (OUTPATIENT)
Age: 63
End: 2023-01-24

## 2023-03-15 ENCOUNTER — APPOINTMENT (OUTPATIENT)
Dept: FAMILY MEDICINE | Facility: CLINIC | Age: 63
End: 2023-03-15
Payer: COMMERCIAL

## 2023-03-15 VITALS
RESPIRATION RATE: 16 BRPM | BODY MASS INDEX: 24.32 KG/M2 | SYSTOLIC BLOOD PRESSURE: 110 MMHG | HEIGHT: 65 IN | DIASTOLIC BLOOD PRESSURE: 78 MMHG | HEART RATE: 87 BPM | WEIGHT: 146 LBS

## 2023-03-15 DIAGNOSIS — E78.5 HYPERLIPIDEMIA, UNSPECIFIED: ICD-10-CM

## 2023-03-15 DIAGNOSIS — R73.03 PREDIABETES.: ICD-10-CM

## 2023-03-15 PROCEDURE — 99213 OFFICE O/P EST LOW 20 MIN: CPT | Mod: 25

## 2023-03-15 PROCEDURE — 36415 COLL VENOUS BLD VENIPUNCTURE: CPT

## 2023-03-15 NOTE — HEALTH RISK ASSESSMENT
[0] : 2) Feeling down, depressed, or hopeless: Not at all (0) [PHQ-2 Negative - No further assessment needed] : PHQ-2 Negative - No further assessment needed [[id number]] : 0

## 2023-03-15 NOTE — PHYSICAL EXAM
Pt present for FLU vaccine. Tolerated and discharged .      [id number]  6/30/20  [id number]  [facility], [place]  LT DEL     [Normal] : affect was normal and insight and judgment were intact

## 2023-03-15 NOTE — HISTORY OF PRESENT ILLNESS
[FreeTextEntry1] : Follow up in  medical conditions [de-identified] : Mr. CAT CHEN is a pleasant 62 year old male with PMH of  HLD, BPH, pre-DM, BEBE no yet on CPAP who comes in to the office for follow up in medical conditions. No other complaints.\par \par Urology: Christine

## 2023-04-21 ENCOUNTER — RX RENEWAL (OUTPATIENT)
Age: 63
End: 2023-04-21

## 2023-07-10 ENCOUNTER — RX RENEWAL (OUTPATIENT)
Age: 63
End: 2023-07-10

## 2023-11-06 ENCOUNTER — RX RENEWAL (OUTPATIENT)
Age: 63
End: 2023-11-06

## 2023-11-06 RX ORDER — OMEGA-3-ACID ETHYL ESTERS CAPSULES 1 G/1
1 CAPSULE, LIQUID FILLED ORAL
Qty: 120 | Refills: 0 | Status: ACTIVE | COMMUNITY
Start: 2021-12-23 | End: 1900-01-01

## 2024-01-15 ENCOUNTER — RX RENEWAL (OUTPATIENT)
Age: 64
End: 2024-01-15

## 2024-01-15 RX ORDER — ROSUVASTATIN CALCIUM 20 MG/1
20 TABLET, FILM COATED ORAL
Qty: 30 | Refills: 0 | Status: ACTIVE | COMMUNITY
Start: 2023-03-15 | End: 1900-01-01

## 2024-01-16 ENCOUNTER — RX RENEWAL (OUTPATIENT)
Age: 64
End: 2024-01-16

## 2024-05-09 ENCOUNTER — APPOINTMENT (OUTPATIENT)
Dept: INTERNAL MEDICINE | Facility: CLINIC | Age: 64
End: 2024-05-09
Payer: COMMERCIAL

## 2024-05-09 VITALS
TEMPERATURE: 97.7 F | WEIGHT: 140 LBS | DIASTOLIC BLOOD PRESSURE: 69 MMHG | BODY MASS INDEX: 23.9 KG/M2 | OXYGEN SATURATION: 97 % | RESPIRATION RATE: 16 BRPM | HEIGHT: 64 IN | HEART RATE: 77 BPM | SYSTOLIC BLOOD PRESSURE: 104 MMHG

## 2024-05-09 DIAGNOSIS — R91.8 OTHER NONSPECIFIC ABNORMAL FINDING OF LUNG FIELD: ICD-10-CM

## 2024-05-09 DIAGNOSIS — J18.9 PNEUMONIA, UNSPECIFIED ORGANISM: ICD-10-CM

## 2024-05-09 DIAGNOSIS — J45.909 UNSPECIFIED ASTHMA, UNCOMPLICATED: ICD-10-CM

## 2024-05-09 PROCEDURE — 94727 GAS DIL/WSHOT DETER LNG VOL: CPT

## 2024-05-09 PROCEDURE — 94060 EVALUATION OF WHEEZING: CPT

## 2024-05-09 PROCEDURE — 94729 DIFFUSING CAPACITY: CPT

## 2024-05-09 PROCEDURE — ZZZZZ: CPT

## 2024-05-09 PROCEDURE — 99204 OFFICE O/P NEW MOD 45 MIN: CPT | Mod: 25

## 2024-05-09 RX ORDER — PREDNISONE 10 MG/1
10 TABLET ORAL
Qty: 1 | Refills: 0 | Status: ACTIVE | COMMUNITY
Start: 2024-05-09 | End: 1900-01-01

## 2024-05-09 RX ORDER — BENZONATATE 200 MG/1
200 CAPSULE ORAL 3 TIMES DAILY
Qty: 30 | Refills: 0 | Status: ACTIVE | COMMUNITY
Start: 2024-05-09 | End: 1900-01-01

## 2024-05-10 ENCOUNTER — APPOINTMENT (OUTPATIENT)
Dept: INTERNAL MEDICINE | Facility: CLINIC | Age: 64
End: 2024-05-10

## 2024-05-10 DIAGNOSIS — J20.9 ACUTE BRONCHITIS, UNSPECIFIED: ICD-10-CM

## 2024-05-10 RX ORDER — CEFUROXIME AXETIL 500 MG/1
500 TABLET ORAL
Qty: 14 | Refills: 0 | Status: ACTIVE | COMMUNITY
Start: 2024-05-10 | End: 1900-01-01

## 2024-05-10 NOTE — PHYSICAL EXAM
[No Acute Distress] : no acute distress [Normal Appearance] : normal appearance [No Neck Mass] : no neck mass [Normal Rate/Rhythm] : normal rate/rhythm [Normal S1, S2] : normal s1, s2 [No Murmurs] : no murmurs [No Resp Distress] : no resp distress [No Abnormalities] : no abnormalities [No Clubbing] : no clubbing [No Cyanosis] : no cyanosis [No Edema] : no edema [No Focal Deficits] : no focal deficits [Oriented x3] : oriented x3 [Normal Affect] : normal affect [TextBox_2] : coughing during exam, mostly dry [TextBox_68] : bilateral prolonged expiratory phase

## 2024-05-10 NOTE — REASON FOR VISIT
[Consultation] : a consultation [Cough] : cough [[employer] ] : provided by [employer]   [Acute] : an acute visit [TextBox_13] : Dr. Eisenberg

## 2024-05-10 NOTE — PLAN
[TextEntry] : Persistent Cough . s/p  Bronchopneumonia: - The patient presented with a persistent cough affecting sleep, despite receiving Levaquin. Chest x-ray per patient showed bronchopneumonia. No known exposure to tuberculosis. He is wheezing during exam. Suspect component of post infectious reactive airway disease. - Plan:   a. Start of course of Prednisone taper. Ordered symbicort as well.    b. Prescribe cough medication to be taken up to three times a day as needed.   c. CT scan of the chest to see the status of pneumonia, will also assess the "spots in the lungs".   d. Sputum culture/ sensitivity to r/o any resistant organisms.   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 - Plan:   a. Educate the patient on the potential risks of occupational exposure to smoke and discuss possible preventive measures.   b. PFT did not show any obstruction though clinically he is wheezing.  The patient will call or set up an appointment or visit ER if symptoms do not improve or get worse. He will see me in follow up in 4-5 days.  Addendum: 5/10/24 2:38 pm,  the sputum culture was positive for diplococci. I suspect pneumococcus. I ordered Ceftin. Called patient and left a voicemail.

## 2024-05-10 NOTE — RESULTS/DATA
[TextEntry] :  PFT 05/10/2024 Spirometry (FEV1): 83 (%predicted) Ratio: 90 Lung volumes (TLC):   94 (% predicted) Diffusion capacity (DLCO):  93 (% predicted) No significant bronchodilator response   Interpretation: Normal PFT  No recent CXR to review

## 2024-05-10 NOTE — REVIEW OF SYSTEMS
[Negative] : Genitourinary [Fever] : no fever [Fatigue] : no fatigue [Chills] : no chills [Poor Appetite] : no poor appetite [Dry Eyes] : no dry eyes [Eye Irritation] : no eye irritation [TextBox_30] : per HPI

## 2024-05-10 NOTE — HISTORY OF PRESENT ILLNESS
[Never] : never [TextBox_4] : Jackson , a 63-year-old male originally from [place], presents with a persistent cough that began three weeks ago, accompanied initially by flu symptoms and a headache. Despite treatment with a course of Levaquin and "bromphen" from an ER visit, his symptoms have not improved, and the cough continues to disrupt his sleep. He reports no recent travel outside the country or known exposure to tuberculosis. 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  Further, Jackson had a chest x-ray performed 10 days ago at [facility], which reportedly indicated bronchopneumonia. He also has a history of "spots on his lungs", though he was advised not to worry about them.

## 2024-05-14 ENCOUNTER — APPOINTMENT (OUTPATIENT)
Dept: INTERNAL MEDICINE | Facility: CLINIC | Age: 64
End: 2024-05-14

## 2024-05-14 LAB — BACTERIA SPT CULT: NORMAL

## 2024-05-21 RX ORDER — FLUTICASONE FUROATE 200 UG/1
200 POWDER RESPIRATORY (INHALATION) DAILY
Qty: 1 | Refills: 3 | Status: ACTIVE | COMMUNITY
Start: 2024-05-21 | End: 1900-01-01

## 2024-05-21 RX ORDER — MOMETASONE FUROATE 110 UG/1
110 INHALANT RESPIRATORY (INHALATION) TWICE DAILY
Qty: 1 | Refills: 1 | Status: DISCONTINUED | COMMUNITY
Start: 2024-05-09 | End: 2024-05-21
